# Patient Record
Sex: MALE | Race: WHITE | NOT HISPANIC OR LATINO | URBAN - METROPOLITAN AREA
[De-identification: names, ages, dates, MRNs, and addresses within clinical notes are randomized per-mention and may not be internally consistent; named-entity substitution may affect disease eponyms.]

---

## 2022-10-12 ENCOUNTER — EMERGENCY (EMERGENCY)
Facility: HOSPITAL | Age: 47
LOS: 1 days | Discharge: HOME | End: 2022-10-12
Attending: EMERGENCY MEDICINE
Payer: COMMERCIAL

## 2022-10-12 VITALS
DIASTOLIC BLOOD PRESSURE: 87 MMHG | OXYGEN SATURATION: 98 % | HEART RATE: 115 BPM | RESPIRATION RATE: 16 BRPM | SYSTOLIC BLOOD PRESSURE: 142 MMHG | TEMPERATURE: 102 F

## 2022-10-12 VITALS
HEIGHT: 69 IN | SYSTOLIC BLOOD PRESSURE: 133 MMHG | WEIGHT: 169.98 LBS | OXYGEN SATURATION: 97 % | TEMPERATURE: 102 F | HEART RATE: 130 BPM | RESPIRATION RATE: 18 BRPM | DIASTOLIC BLOOD PRESSURE: 96 MMHG

## 2022-10-12 LAB
ALBUMIN SERPL ELPH-MCNC: 4.6 G/DL — SIGNIFICANT CHANGE UP (ref 3.3–5)
ALP SERPL-CCNC: 50 U/L — SIGNIFICANT CHANGE UP (ref 40–120)
ALT FLD-CCNC: 14 U/L — SIGNIFICANT CHANGE UP (ref 10–45)
ANION GAP SERPL CALC-SCNC: 12 MMOL/L — SIGNIFICANT CHANGE UP (ref 5–17)
AST SERPL-CCNC: 17 U/L — SIGNIFICANT CHANGE UP (ref 10–40)
BASOPHILS # BLD AUTO: 0.01 K/UL — SIGNIFICANT CHANGE UP (ref 0–0.2)
BASOPHILS NFR BLD AUTO: 0.2 % — SIGNIFICANT CHANGE UP (ref 0–2)
BILIRUB SERPL-MCNC: 0.6 MG/DL — SIGNIFICANT CHANGE UP (ref 0.2–1.2)
BUN SERPL-MCNC: 9 MG/DL — SIGNIFICANT CHANGE UP (ref 7–23)
CALCIUM SERPL-MCNC: 8.9 MG/DL — SIGNIFICANT CHANGE UP (ref 8.4–10.5)
CHLORIDE SERPL-SCNC: 100 MMOL/L — SIGNIFICANT CHANGE UP (ref 96–108)
CO2 SERPL-SCNC: 24 MMOL/L — SIGNIFICANT CHANGE UP (ref 22–31)
CREAT SERPL-MCNC: 0.95 MG/DL — SIGNIFICANT CHANGE UP (ref 0.5–1.3)
EGFR: 98 ML/MIN/1.73M2 — SIGNIFICANT CHANGE UP
EOSINOPHIL # BLD AUTO: 0.01 K/UL — SIGNIFICANT CHANGE UP (ref 0–0.5)
EOSINOPHIL NFR BLD AUTO: 0.2 % — SIGNIFICANT CHANGE UP (ref 0–6)
FLUAV AG NPH QL: SIGNIFICANT CHANGE UP
FLUBV AG NPH QL: SIGNIFICANT CHANGE UP
GLUCOSE SERPL-MCNC: 98 MG/DL — SIGNIFICANT CHANGE UP (ref 70–99)
HCT VFR BLD CALC: 40.9 % — SIGNIFICANT CHANGE UP (ref 39–50)
HGB BLD-MCNC: 13.9 G/DL — SIGNIFICANT CHANGE UP (ref 13–17)
IMM GRANULOCYTES NFR BLD AUTO: 0.2 % — SIGNIFICANT CHANGE UP (ref 0–0.9)
LYMPHOCYTES # BLD AUTO: 0.66 K/UL — LOW (ref 1–3.3)
LYMPHOCYTES # BLD AUTO: 15.8 % — SIGNIFICANT CHANGE UP (ref 13–44)
MCHC RBC-ENTMCNC: 29.9 PG — SIGNIFICANT CHANGE UP (ref 27–34)
MCHC RBC-ENTMCNC: 34 GM/DL — SIGNIFICANT CHANGE UP (ref 32–36)
MCV RBC AUTO: 88 FL — SIGNIFICANT CHANGE UP (ref 80–100)
MONOCYTES # BLD AUTO: 0.55 K/UL — SIGNIFICANT CHANGE UP (ref 0–0.9)
MONOCYTES NFR BLD AUTO: 13.1 % — SIGNIFICANT CHANGE UP (ref 2–14)
NEUTROPHILS # BLD AUTO: 2.95 K/UL — SIGNIFICANT CHANGE UP (ref 1.8–7.4)
NEUTROPHILS NFR BLD AUTO: 70.5 % — SIGNIFICANT CHANGE UP (ref 43–77)
NRBC # BLD: 0 /100 WBCS — SIGNIFICANT CHANGE UP (ref 0–0)
PLATELET # BLD AUTO: 192 K/UL — SIGNIFICANT CHANGE UP (ref 150–400)
POTASSIUM SERPL-MCNC: 3.8 MMOL/L — SIGNIFICANT CHANGE UP (ref 3.5–5.3)
POTASSIUM SERPL-SCNC: 3.8 MMOL/L — SIGNIFICANT CHANGE UP (ref 3.5–5.3)
PROT SERPL-MCNC: 7.1 G/DL — SIGNIFICANT CHANGE UP (ref 6–8.3)
RBC # BLD: 4.65 M/UL — SIGNIFICANT CHANGE UP (ref 4.2–5.8)
RBC # FLD: 12.5 % — SIGNIFICANT CHANGE UP (ref 10.3–14.5)
RSV RNA NPH QL NAA+NON-PROBE: SIGNIFICANT CHANGE UP
SARS-COV-2 RNA SPEC QL NAA+PROBE: DETECTED
SODIUM SERPL-SCNC: 136 MMOL/L — SIGNIFICANT CHANGE UP (ref 135–145)
WBC # BLD: 4.19 K/UL — SIGNIFICANT CHANGE UP (ref 3.8–10.5)
WBC # FLD AUTO: 4.19 K/UL — SIGNIFICANT CHANGE UP (ref 3.8–10.5)

## 2022-10-12 PROCEDURE — 87637 SARSCOV2&INF A&B&RSV AMP PRB: CPT

## 2022-10-12 PROCEDURE — 80053 COMPREHEN METABOLIC PANEL: CPT

## 2022-10-12 PROCEDURE — 93005 ELECTROCARDIOGRAM TRACING: CPT

## 2022-10-12 PROCEDURE — 99284 EMERGENCY DEPT VISIT MOD MDM: CPT

## 2022-10-12 PROCEDURE — 71045 X-RAY EXAM CHEST 1 VIEW: CPT | Mod: 26

## 2022-10-12 PROCEDURE — 71045 X-RAY EXAM CHEST 1 VIEW: CPT

## 2022-10-12 PROCEDURE — 99285 EMERGENCY DEPT VISIT HI MDM: CPT | Mod: 25

## 2022-10-12 PROCEDURE — 85025 COMPLETE CBC W/AUTO DIFF WBC: CPT

## 2022-10-12 RX ORDER — ACETAMINOPHEN 500 MG
975 TABLET ORAL ONCE
Refills: 0 | Status: COMPLETED | OUTPATIENT
Start: 2022-10-12 | End: 2022-10-12

## 2022-10-12 RX ORDER — SODIUM CHLORIDE 9 MG/ML
1000 INJECTION INTRAMUSCULAR; INTRAVENOUS; SUBCUTANEOUS ONCE
Refills: 0 | Status: COMPLETED | OUTPATIENT
Start: 2022-10-12 | End: 2022-10-12

## 2022-10-12 RX ORDER — IBUPROFEN 200 MG
600 TABLET ORAL ONCE
Refills: 0 | Status: COMPLETED | OUTPATIENT
Start: 2022-10-12 | End: 2022-10-12

## 2022-10-12 RX ADMIN — Medication 975 MILLIGRAM(S): at 15:09

## 2022-10-12 RX ADMIN — SODIUM CHLORIDE 1000 MILLILITER(S): 9 INJECTION INTRAMUSCULAR; INTRAVENOUS; SUBCUTANEOUS at 15:17

## 2022-10-12 RX ADMIN — Medication 600 MILLIGRAM(S): at 15:09

## 2022-10-12 NOTE — ED ADULT NURSE REASSESSMENT NOTE - NS ED NURSE REASSESS COMMENT FT1
Report received from CALLIE Zepeda. PT is resting comfortably, breathing unlabored, and speaking in complete sentences. Updated PT on plan of care. Awaiting MD re-assessment and disposition. Safety and comfort maintained. Call bell within reach.

## 2022-10-12 NOTE — ED ADULT NURSE NOTE - CAS ELECT INFOMATION PROVIDED
ED MD Valladares  d/c pt hair RN present. Repeat VS were not documented due to RN not aware of d/c.. MD Reporting HR of 98 at that time./DC instructions

## 2022-10-12 NOTE — ED PROVIDER NOTE - OBJECTIVE STATEMENT
Patient is a 47 year old male with no significant past medical history presenting to the Emergency Department with chief complaint of cough.  Patient reports generalized malaise and joint pains for the past 1 week.  Patient states he tested positive for COVID-19 the morning of presentation.  Patient states he has received his COVID vaccine and booster.  Patient notes mild cough.  Patient denies any dyspnea, chest pain, nausea, abdominal pain, or other physical complaints.  No sick contacts or recent travel.

## 2022-10-12 NOTE — ED PROVIDER NOTE - NSFOLLOWUPINSTRUCTIONS_ED_ALL_ED_FT
There were no signs of an emergency medical condition on completion of today's workup.  You will need further medical care and evaluation. A presumptive diagnosis has been made, however further evaluation may be required by your primary care doctor or specialist for a definitive diagnosis.      Follow up with your medical doctor in 2-3 days or call our clinic at [phone] and state you were seen in the Emergency Department and would like to be seen in clinic. You may also call [phone] to speak with a representative to assist follow up care with medicine, surgery, or specialists.    If you are having pain, take Tylenol/acetaminophen 1 g every six hours and supplement (if allowed by your physician, and if you're not having gastric/gastrointestinal/stomach/intestinal problems) with ibuprofen 600 mg, with food or milk/maalox, every six hours which can be taken three hours apart from the Tylenol to have a layered effect.     Be sure to take no more than 4000mg or 4g of Tylenol/acetaminophen in a 24 hour period. Be sure to check your other medications to see if they include Tylenol/acetaminophen and include them in your calculations to ensure you do not take more than 4000mg or 4g of Tylenol/acetaminophen a day.    Drink at least 2 Liters or 64 Ounces of water each day (UNLESS you are supposed to restrict fluids or have a history of congestive heart failure (CHF)).    Return for any persistent, worsening symptoms, or ANY concerns at all.

## 2022-10-12 NOTE — ED ADULT NURSE NOTE - OBJECTIVE STATEMENT
Pt 48 y/o male, AxOx3, presents to ED from home complaining of covid + and symptoms of dizziness and fever x 2 days. Pt developed fever/ dizziness yesterday, tested + on at home test prompting ED visit. Pt is well appearing, speaking full sentences without difficulty. Breathing spontaneous and unlabored on room air. Upon assessment, abdomen soft and nontender, +strong peripheral pulses, moving all extremities without difficulty, lungs clear. Pt arrived to ED febrile, tachycardic. Upon assessment, abdomen soft and nontender, +strong peripheral pulses, moving all extremities without difficulty, lungs clear. Safety and comfort measures initiated- bed placed in lowest position and side rails raised. Pt oriented to call bell system.

## 2022-10-12 NOTE — ED PROVIDER NOTE - SHIFT CHANGE DETAILS
Attending note (Hernan): I have endorsed this patient to the incoming physician, including clinical history, physical exam, current results and assessment/plan.

## 2022-10-12 NOTE — ED PROVIDER NOTE - NS ED ROS FT
CONSTITUTIONAL: + malaise, fever  EYES/ENT: No visual changes;  No vertigo or throat pain   NECK: No pain or stiffness  RESPIRATORY: + cough  CARDIOVASCULAR: No chest pain or palpitations  GASTROINTESTINAL: No abdominal or epigastric pain. No nausea, vomiting, or hematemesis; No diarrhea or constipation. No melena or hematochezia.  GENITOURINARY: No dysuria, frequency or hematuria  NEUROLOGICAL: No numbness or weakness  SKIN: No itching, burning, rashes, or lesions   All other review of systems is negative unless indicated above.

## 2022-10-12 NOTE — ED PROVIDER NOTE - ATTENDING CONTRIBUTION TO CARE
Attending Statement (SLOANE Becerra MD):    HPI: 46y/o M, h/o anxiety, presenting with cough, fever, body aches and fatigue since yesterday; no vomiting/diarrhea, no chest/abdominal pain, no rashes. +Covid test today.     Review of Systems:  -General: +fever +chills  -ENT: +congestion, no difficulty swallowing  -Pulmonary: +cough, no shortness of breath  -Cardiac: no chest pain, no palpitations  -Gastrointestinal: no abdominal pain, no nausea, no vomiting, and no diarrhea.  -Genitourinary: no blood or pain with urination  -Musculoskeletal: no back or neck pain  -Skin: no rashes  -Endocrine: No h/o diabetes   -Neurologic: No new weakness or numbness in extremities    All else negative unless otherwise specified elsewhere in this note.    PSH/PMH as noted above    On Physical Exam:  General: well appearing, in NAD, speaking clearly in full sentences and without difficulty; cooperative with exam  HEENT: anicteric sclera, airway patent, oropharyngeal examination deferred (no complaints of sore throat or difficulty swallowing and concern for COVID infection)  Neck: no neck tenderness, no nuchal rigidity  Cardiac: tachycardic 140bpm, regular s1, s2; no MGR  Lungs: CTABL  Abdomen: soft nontender/nondistended  : no bladder tenderness or distension  Skin: intact, no rash  Extremities: no peripheral edema, no gross deformities    MDM: tachycardic/febile; likely 2/2 covid; check screening labs: cbc (to evaluate for leukocytosis or anemia), CMP (to evaluate for electrolyte abnormalities or renal/liver dysfunction), and obtain CXR (eval for consolidation/ptx); give tylenol/nsaids, iv fluids and reassess; if improving and no acute actionable findings on labs, will be stable for dc.

## 2022-10-12 NOTE — ED PROVIDER NOTE - PROGRESS NOTE DETAILS
DO Jacquelyn: Patient re-assessed and reports improvement of symptoms at this time.  No physical complaints reported.  repeat heart rate: 92.

## 2022-10-12 NOTE — ED PROVIDER NOTE - PATIENT PORTAL LINK FT
You can access the FollowMyHealth Patient Portal offered by [facility] by registering at the following website: [email or web address]. By joining [employer]’s FollowMyHealth portal, you will also be able to view your health information using other applications (apps) compatible with our system.

## 2022-10-12 NOTE — ED PROVIDER NOTE - CLINICAL SUMMARY MEDICAL DECISION MAKING FREE TEXT BOX
Patient is a 47 year old male with no significant past medical history presenting to the Emergency Department with chief complaint of cough.  Patient was evaluated for pulmonary and infectious etiology of symptoms.  Patient previously tested positive for COVID-19 and displayed symptoms 7-10 days prior.  Patient received plain films and was treated symptomatically with ibuprofen and acetaminophen. Patient is a 47 year old male with no significant past medical history presenting to the Emergency Department with chief complaint of cough.  Patient was evaluated for pulmonary and infectious etiology of symptoms.  Patient previously tested positive for COVID-19 and displayed symptoms 7-10 days prior.  Patient received plain films and was treated symptomatically with ibuprofen and acetaminophen.  Labs and testing were negative for acute pathology.  Patient reports improvement of symptoms at this time.  Patient verbalizes understanding of all testing performed throughout their stay.  Patient agrees with plan for discharge and outpatient follow-up.

## 2024-01-05 NOTE — ED ADULT TRIAGE NOTE - ARRIVAL FROM
transfer from [place] for tertiary level care in the setting of diffuse body rash transfer from [place] for tertiary level care in the setting of diffuse body rash Home